# Patient Record
Sex: FEMALE | Race: BLACK OR AFRICAN AMERICAN | NOT HISPANIC OR LATINO | Employment: FULL TIME | URBAN - METROPOLITAN AREA
[De-identification: names, ages, dates, MRNs, and addresses within clinical notes are randomized per-mention and may not be internally consistent; named-entity substitution may affect disease eponyms.]

---

## 2019-11-11 ENCOUNTER — OFFICE VISIT (OUTPATIENT)
Dept: OBSTETRICS AND GYNECOLOGY | Facility: CLINIC | Age: 39
End: 2019-11-11
Payer: MEDICAID

## 2019-11-11 VITALS
HEIGHT: 67 IN | WEIGHT: 183.56 LBS | DIASTOLIC BLOOD PRESSURE: 72 MMHG | BODY MASS INDEX: 28.81 KG/M2 | SYSTOLIC BLOOD PRESSURE: 120 MMHG

## 2019-11-11 DIAGNOSIS — D25.9 UTERINE LEIOMYOMA, UNSPECIFIED LOCATION: ICD-10-CM

## 2019-11-11 DIAGNOSIS — N93.9 ABNORMAL UTERINE BLEEDING (AUB): Primary | ICD-10-CM

## 2019-11-11 DIAGNOSIS — Z21 ASYMPTOMATIC HIV INFECTION: ICD-10-CM

## 2019-11-11 LAB
B-HCG UR QL: NEGATIVE
BILIRUB SERPL-MCNC: ABNORMAL MG/DL
BLOOD URINE, POC: ABNORMAL
COLOR, POC UA: ABNORMAL
CTP QC/QA: YES
GLUCOSE UR QL STRIP: NORMAL
KETONES UR QL STRIP: ABNORMAL
LEUKOCYTE ESTERASE URINE, POC: ABNORMAL
NITRITE, POC UA: ABNORMAL
PH, POC UA: 5
PROTEIN, POC: 30
SPECIFIC GRAVITY, POC UA: 1.02
UROBILINOGEN, POC UA: NORMAL

## 2019-11-11 PROCEDURE — 99999 PR PBB SHADOW E&M-NEW PATIENT-LVL III: ICD-10-PCS | Mod: PBBFAC,,, | Performed by: OBSTETRICS & GYNECOLOGY

## 2019-11-11 PROCEDURE — 81025 URINE PREGNANCY TEST: CPT | Mod: PBBFAC,PN | Performed by: OBSTETRICS & GYNECOLOGY

## 2019-11-11 PROCEDURE — 99203 PR OFFICE/OUTPT VISIT, NEW, LEVL III, 30-44 MIN: ICD-10-PCS | Mod: S$PBB,,, | Performed by: OBSTETRICS & GYNECOLOGY

## 2019-11-11 PROCEDURE — 99203 OFFICE O/P NEW LOW 30 MIN: CPT | Mod: S$PBB,,, | Performed by: OBSTETRICS & GYNECOLOGY

## 2019-11-11 PROCEDURE — 99999 PR PBB SHADOW E&M-NEW PATIENT-LVL III: CPT | Mod: PBBFAC,,, | Performed by: OBSTETRICS & GYNECOLOGY

## 2019-11-11 PROCEDURE — 99203 OFFICE O/P NEW LOW 30 MIN: CPT | Mod: PBBFAC,PN | Performed by: OBSTETRICS & GYNECOLOGY

## 2019-11-11 PROCEDURE — 81002 URINALYSIS NONAUTO W/O SCOPE: CPT | Mod: PBBFAC,PN | Performed by: OBSTETRICS & GYNECOLOGY

## 2019-11-11 RX ORDER — ERGOCALCIFEROL 1.25 MG/1
50000 CAPSULE ORAL
COMMUNITY
Start: 2019-10-22 | End: 2020-03-12 | Stop reason: CLARIF

## 2019-11-11 RX ORDER — EFAVIRENZ, EMTRICITABINE, AND TENOFOVIR DISOPROXIL FUMARATE 600; 200; 300 MG/1; MG/1; MG/1
1 TABLET, FILM COATED ORAL DAILY
COMMUNITY
Start: 2019-10-22 | End: 2020-03-12 | Stop reason: CLARIF

## 2019-11-11 NOTE — LETTER
November 11, 2019      Jenny Lombardo MD  [address] [facility]  [address]  [facility] [address]           [facility]  [address], [facility] [address] [place] [address]  Phone: [phone]  Fax: [phone]          Patient: Claudia Wright   MR Number: [id number]   YOB: 1980   Date of Visit: 11/11/2019       Dear Dr. Jenny Lombardo:    Thank you for referring Claudia Wright to me for evaluation. Attached you will find relevant portions of my assessment and plan of care.    If you have questions, please do not hesitate to call me. I look forward to following Claudia Wright along with you.    Sincerely,    FCO Shrestha MD    Enclosure  CC:  No Recipients    If you would like to receive this communication electronically, please contact [email or web address] or [phone] to request more information on [employer] access.    For providers and/or their staff who would like to refer a patient to [facility], please contact us through our one-stop-shop provider referral line, [facility], [place], at [phone].    If you feel you have received this communication in error or would no longer like to receive these types of communications, please e-mail [email or web address]

## 2019-11-11 NOTE — PROGRESS NOTES
"History & Physical  Gynecology      SUBJECTIVE:     Chief Complaint: Fibroids       History of Present Illness:  38 y/o female presents today for discussion of management of leiomyoma. Per patient her menstrual cycles have become "very heavy" over the past year, and she uses 7-8 pads per day, cycles last 4-5 days per month. She has normal monthly cycles. She had an U/S performed at an outside facility she states she had 10 fibroids and they were "large"    Of note she has HIV, she is compliant with her medication, CD4 count is normal, viral load is undetectable.    Abdominal surgeries: lap BTL  OB hx:  x4,       Review of patient's allergies indicates:  No Known Allergies    History reviewed. No pertinent past medical history.  Past Surgical History:   Procedure Laterality Date    TUBAL LIGATION       OB History        4    Para        Term                AB        Living   4       SAB        TAB        Ectopic        Multiple        Live Births                   Family History   Problem Relation Age of Onset    Breast cancer Mother     Colon cancer Neg Hx     Ovarian cancer Neg Hx      Social History     Tobacco Use    Smoking status: Unknown If Ever Smoked   Substance Use Topics    Alcohol use: Yes     Frequency: Never     Comment: ocassional    Drug use: No       Current Outpatient Medications   Medication Sig    ATRIPLA 600-200-300 mg Tab     VITAMIN D2 50,000 unit capsule     meloxicam (MOBIC) 15 MG tablet Take 1 tablet (15 mg total) by mouth once daily. (Patient not taking: Reported on 2019)     No current facility-administered medications for this visit.          Review of Systems:  Review of Systems   Constitutional: Negative for appetite change, chills, diaphoresis, fatigue and fever.   Respiratory: Negative for cough and shortness of breath.    Cardiovascular: Negative for chest pain and palpitations.   Gastrointestinal: Negative.  Negative for abdominal pain, constipation, " diarrhea, nausea and vomiting.   Genitourinary: Positive for menorrhagia and menstrual problem. Negative for decreased libido, dysuria, frequency, pelvic pain, urgency, vaginal bleeding, vaginal discharge, vaginal pain and vaginal odor.        OBJECTIVE:     Physical Exam:  Physical Exam   Constitutional: She appears well-developed and well-nourished.   Neck: No tracheal deviation present. No thyromegaly present.   Cardiovascular: Normal rate and regular rhythm. Exam reveals no gallop and no friction rub.   No murmur heard.  Pulmonary/Chest: Effort normal and breath sounds normal. No stridor. No respiratory distress. She has no wheezes. She has no rales. She exhibits no tenderness.   Abdominal: Soft. Bowel sounds are normal.   Genitourinary: No vaginal discharge found.   Genitourinary Comments: Normal appearing urethra, urethral meatus, external genital. Cervix without lesions or friability. Uterus is globally enlarged at 16 weeks, mobile.     Lymphadenopathy:     She has no cervical adenopathy.         ASSESSMENT:       ICD-10-CM ICD-9-CM    1. Uterine leiomyoma, unspecified location D25.9 218.9 US Pelvis Comp with Transvag NON-OB (xpd   2. Asymptomatic HIV infection Z21 V08           Plan:      Claudia was seen today for fibroids.    Diagnoses and all orders for this visit:    Uterine leiomyoma, unspecified location  -     US Pelvis Comp with Transvag NON-OB (xpd; Future  -     Discussed medical versus surgical management, patient is aware of possible non-surgical options including depo provera, and OCP's, she is adamantly interested in definitive surgical management.    Asymptomatic HIV infection    Other orders  -     POCT URINE DIPSTICK WITHOUT MICROSCOPE        Orders Placed This Encounter   Procedures    US Pelvis Comp with Transvag NON-OB (xpd       Follow up in about 2 weeks (around 11/25/2019) for Discuss management of AUB.     Counseling time: 45 minutes    SHIREEN Shrestha

## 2019-11-15 ENCOUNTER — TELEPHONE (OUTPATIENT)
Dept: OBSTETRICS AND GYNECOLOGY | Facility: CLINIC | Age: 39
End: 2019-11-15

## 2019-11-15 NOTE — TELEPHONE ENCOUNTER
Spoke with pt about US results.. Verbalized understanding. Consult scheduled for 11-20-19 with Dr. Shrestha

## 2019-11-15 NOTE — TELEPHONE ENCOUNTER
----- Message from FCO Shrestha MD sent at 11/15/2019  2:05 PM CST -----  Globally enlarged uterus, patient desires surgery, please schedule a visit so she can discuss management options with me.

## 2019-11-20 ENCOUNTER — OFFICE VISIT (OUTPATIENT)
Dept: OBSTETRICS AND GYNECOLOGY | Facility: CLINIC | Age: 39
End: 2019-11-20
Payer: MEDICAID

## 2019-11-20 VITALS
HEIGHT: 67 IN | SYSTOLIC BLOOD PRESSURE: 108 MMHG | DIASTOLIC BLOOD PRESSURE: 72 MMHG | BODY MASS INDEX: 28.44 KG/M2 | WEIGHT: 181.19 LBS

## 2019-11-20 DIAGNOSIS — Z01.818 PRE-OP EVALUATION: Primary | ICD-10-CM

## 2019-11-20 PROCEDURE — 99999 PR PBB SHADOW E&M-EST. PATIENT-LVL III: CPT | Mod: PBBFAC,,, | Performed by: OBSTETRICS & GYNECOLOGY

## 2019-11-20 PROCEDURE — 99499 NO LOS: ICD-10-PCS | Mod: S$PBB,,, | Performed by: OBSTETRICS & GYNECOLOGY

## 2019-11-20 PROCEDURE — 87801 DETECT AGNT MULT DNA AMPLI: CPT

## 2019-11-20 PROCEDURE — 99999 PR PBB SHADOW E&M-EST. PATIENT-LVL III: ICD-10-PCS | Mod: PBBFAC,,, | Performed by: OBSTETRICS & GYNECOLOGY

## 2019-11-20 PROCEDURE — 99499 UNLISTED E&M SERVICE: CPT | Mod: S$PBB,,, | Performed by: OBSTETRICS & GYNECOLOGY

## 2019-11-20 PROCEDURE — 99213 OFFICE O/P EST LOW 20 MIN: CPT | Mod: PBBFAC,PN | Performed by: OBSTETRICS & GYNECOLOGY

## 2019-11-20 PROCEDURE — 87481 CANDIDA DNA AMP PROBE: CPT | Mod: 59

## 2019-11-20 PROCEDURE — 87491 CHLMYD TRACH DNA AMP PROBE: CPT | Mod: 59

## 2019-11-20 RX ORDER — MUPIROCIN 20 MG/G
OINTMENT TOPICAL
Status: CANCELLED | OUTPATIENT
Start: 2019-11-20

## 2019-11-20 NOTE — PROGRESS NOTES
History & Physical  Gynecology      SUBJECTIVE:     Chief Complaint: Consult (u/s results)       History of Present Illness:  38 y/o presents today for discussion of management of AUB-L. U/S revealed globally enlarged uterus (16x11). She is not interested in medical management after being counseled of all her options and desires definitive surgical management because her menorrhagia is affecting her quality of life.       Review of patient's allergies indicates:  No Known Allergies    History reviewed. No pertinent past medical history.  Past Surgical History:   Procedure Laterality Date    TUBAL LIGATION       OB History        4    Para        Term                AB        Living   4       SAB        TAB        Ectopic        Multiple        Live Births                   Family History   Problem Relation Age of Onset    Breast cancer Mother     Colon cancer Neg Hx     Ovarian cancer Neg Hx      Social History     Tobacco Use    Smoking status: Unknown If Ever Smoked   Substance Use Topics    Alcohol use: Yes     Frequency: Never     Comment: ocassional    Drug use: No       Current Outpatient Medications   Medication Sig    ATRIPLA 600-200-300 mg Tab     VITAMIN D2 50,000 unit capsule     meloxicam (MOBIC) 15 MG tablet Take 1 tablet (15 mg total) by mouth once daily. (Patient not taking: Reported on 2019)     No current facility-administered medications for this visit.          Review of Systems:  Review of Systems   Constitutional: Negative for appetite change, chills, diaphoresis, fatigue and fever.   Respiratory: Negative for cough and shortness of breath.    Cardiovascular: Negative for chest pain and palpitations.   Gastrointestinal: Negative.  Negative for abdominal pain, constipation, diarrhea, nausea and vomiting.   Genitourinary: Positive for menorrhagia. Negative for decreased libido, dysuria, frequency, menstrual problem, pelvic pain, urgency, vaginal bleeding, vaginal  discharge, vaginal pain and vaginal odor.        OBJECTIVE:     Physical Exam:  Physical Exam   Constitutional: She appears well-developed and well-nourished.   Neck: No tracheal deviation present. No thyromegaly present.   Cardiovascular: Normal rate and regular rhythm. Exam reveals no gallop and no friction rub.   No murmur heard.  Pulmonary/Chest: Effort normal and breath sounds normal. No stridor. No respiratory distress. She has no wheezes. She has no rales. She exhibits no tenderness.   Abdominal: Soft. Bowel sounds are normal.   Lymphadenopathy:     She has no cervical adenopathy.         ASSESSMENT:       ICD-10-CM ICD-9-CM    1. Pre-op evaluation Z01.818 V72.84 CBC auto differential      C. trachomatis/N. gonorrhoeae by AMP DNA      Vaginosis Screen by DNA Probe          Plan:      Patient is to have total abdominal hysterectomy for abnormal uterine bleeding 12/10/2019    - Labs - CBC today  - Vaginosis, GC/CT today  - Case request placed & pre-op orders completed  - Consents signed today.  - Anticoagulation : Patient is not on antiocoagulation.      Counseling time: 45 minutes    SHIREEN Shrestha

## 2019-11-21 LAB
BACTERIAL VAGINOSIS DNA: POSITIVE
CANDIDA GLABRATA DNA: NEGATIVE
CANDIDA KRUSEI DNA: NEGATIVE
CANDIDA RRNA VAG QL PROBE: NEGATIVE
T VAGINALIS RRNA GENITAL QL PROBE: POSITIVE

## 2019-11-22 ENCOUNTER — TELEPHONE (OUTPATIENT)
Dept: OBSTETRICS AND GYNECOLOGY | Facility: CLINIC | Age: 39
End: 2019-11-22

## 2019-11-22 DIAGNOSIS — N76.0 BACTERIAL VAGINOSIS: Primary | ICD-10-CM

## 2019-11-22 DIAGNOSIS — B96.89 BACTERIAL VAGINOSIS: Primary | ICD-10-CM

## 2019-11-22 DIAGNOSIS — A59.01 TRICHOMONAS VAGINITIS: ICD-10-CM

## 2019-11-22 LAB
C TRACH DNA SPEC QL NAA+PROBE: NOT DETECTED
N GONORRHOEA DNA SPEC QL NAA+PROBE: NOT DETECTED

## 2019-11-22 RX ORDER — METRONIDAZOLE 500 MG/1
500 TABLET ORAL 2 TIMES DAILY
Qty: 14 TABLET | Refills: 0 | Status: SHIPPED | OUTPATIENT
Start: 2019-11-22 | End: 2019-11-29

## 2019-11-22 NOTE — TELEPHONE ENCOUNTER
Notified patient of results. Verbalized understanding.   Advised pt not to drink while on this medication and for 24 hours after the last dose, to notify any sexual partners shes had in the last 60 days and that her current partner should be treated and can be treated at most clinics.

## 2019-11-22 NOTE — TELEPHONE ENCOUNTER
----- Message from FCO Shrestha MD sent at 11/22/2019 10:47 AM CST -----  Screen positive for trich and BV, please notify patient.

## 2019-12-10 PROBLEM — D62 ACUTE ON CHRONIC BLOOD LOSS ANEMIA: Status: ACTIVE | Noted: 2019-12-10

## 2019-12-10 PROBLEM — Z90.710 S/P TAH (TOTAL ABDOMINAL HYSTERECTOMY): Status: ACTIVE | Noted: 2019-12-10

## 2019-12-30 ENCOUNTER — TELEPHONE (OUTPATIENT)
Dept: OBSTETRICS AND GYNECOLOGY | Facility: CLINIC | Age: 39
End: 2019-12-30

## 2019-12-30 NOTE — TELEPHONE ENCOUNTER
Spoke with pt. Pt states Dr Shrestha had called her to talk about her symptoms. No further questions or concerns noted.

## 2019-12-30 NOTE — TELEPHONE ENCOUNTER
----- Message from Trena Hayes sent at 12/30/2019 11:00 AM CST -----  Contact: BRISA CHANEL [[id number]]  Name of Who is Calling: BRISA CHANEL [[id number]]    What is the request in detail: patient had surgery on 12/10 states her legs and stomach is still numb. Patient would like to know if it is normal. Please call      Can the clinic reply by [facility]: no    [facility]: [phone]

## 2019-12-30 NOTE — TELEPHONE ENCOUNTER
Spoke with patient, she is having mild numbness on left side of leg, reports mainly on anterior and somewhat medial surface as well as numbness on her abdomen. I spoke to her than this happens in about 1:10 open hysterectomies, this is some mild irritation to the femoral nerve, She reports no muscle weakness or lack of function and voices understanding that this will almost certainly resolve on its own.

## 2020-01-20 ENCOUNTER — OFFICE VISIT (OUTPATIENT)
Dept: OBSTETRICS AND GYNECOLOGY | Facility: CLINIC | Age: 40
End: 2020-01-20
Payer: MEDICAID

## 2020-01-20 VITALS
HEIGHT: 67 IN | WEIGHT: 178.25 LBS | DIASTOLIC BLOOD PRESSURE: 76 MMHG | SYSTOLIC BLOOD PRESSURE: 112 MMHG | BODY MASS INDEX: 27.98 KG/M2

## 2020-01-20 DIAGNOSIS — N89.8 VAGINAL DISCHARGE: ICD-10-CM

## 2020-01-20 DIAGNOSIS — Z98.890 POST-OPERATIVE STATE: Primary | ICD-10-CM

## 2020-01-20 PROCEDURE — 99999 PR PBB SHADOW E&M-EST. PATIENT-LVL III: CPT | Mod: PBBFAC,,, | Performed by: OBSTETRICS & GYNECOLOGY

## 2020-01-20 PROCEDURE — 99213 OFFICE O/P EST LOW 20 MIN: CPT | Mod: PBBFAC,PN | Performed by: OBSTETRICS & GYNECOLOGY

## 2020-01-20 PROCEDURE — 87481 CANDIDA DNA AMP PROBE: CPT | Mod: 59

## 2020-01-20 PROCEDURE — 99999 PR PBB SHADOW E&M-EST. PATIENT-LVL III: ICD-10-PCS | Mod: PBBFAC,,, | Performed by: OBSTETRICS & GYNECOLOGY

## 2020-01-20 PROCEDURE — 99499 UNLISTED E&M SERVICE: CPT | Mod: S$PBB,,, | Performed by: OBSTETRICS & GYNECOLOGY

## 2020-01-20 PROCEDURE — 99499 NO LOS: ICD-10-PCS | Mod: S$PBB,,, | Performed by: OBSTETRICS & GYNECOLOGY

## 2020-01-20 PROCEDURE — 87491 CHLMYD TRACH DNA AMP PROBE: CPT

## 2020-01-20 NOTE — PROGRESS NOTES
History & Physical  Gynecology      SUBJECTIVE:     Chief Complaint: Post-op Evaluation       History of Present Illness:  40 y/o s/p ESTELLA/BS 12/10 with cystoscopy. Path benign. Denies any issues.    Uterus, cervix, bilateral tubes, hysterectomy and bilateral salpingectomy:  - Proliferative endometrium.  - Leiomyoma and cellular leiomyoma.  - Cervix without significant pathological change.  - Fallopian tubes without significant pathological change.      Review of patient's allergies indicates:  No Known Allergies    Past Medical History:   Diagnosis Date    Abnormal uterine bleeding (AUB)     Bacterial vaginosis     DUB (dysfunctional uterine bleeding)     HIV (human immunodeficiency virus infection)     Leiomyoma of uterus     Trichomonas vaginitis     Vitamin D deficiency      Past Surgical History:   Procedure Laterality Date    CYSTOSCOPY Bilateral 12/10/2019    Procedure: CYSTOSCOPY;  Surgeon: FCO Shrestha MD;  Location: [facility] OR;  Service: OB/GYN;  Laterality: Bilateral;    TOTAL ABDOMINAL HYSTERECTOMY N/A 12/10/2019    Procedure: HYSTERECTOMY, TOTAL, ABDOMINAL;  Surgeon: FCO Shrestha MD;  Location: [facility] OR;  Service: OB/GYN;  Laterality: N/A;    TUBAL LIGATION       OB History        4    Para        Term                AB        Living   4       SAB        TAB        Ectopic        Multiple        Live Births                   Family History   Problem Relation Age of Onset    Breast cancer Mother     Colon cancer Neg Hx     Ovarian cancer Neg Hx      Social History     Tobacco Use    Smoking status: Unknown If Ever Smoked   Substance Use Topics    Alcohol use: Yes     Frequency: Never     Comment: ocassional    Drug use: No       Current Outpatient Medications   Medication Sig    ATRIPLA 600-200-300 mg Tab Take 1 tablet by mouth once daily.     ferrous sulfate (FEOSOL) 325 mg (65 mg iron) Tab tablet Take 1 tablet (325 mg total) by mouth once daily.    ibuprofen  (ADVIL,MOTRIN) 600 MG tablet Take 1 tablet (600 mg total) by mouth 3 (three) times daily.    VITAMIN D2 50,000 unit capsule Take 50,000 Units by mouth every 7 days.     oxyCODONE-acetaminophen (PERCOCET) 5-325 mg per tablet Take 1 tablet by mouth every 4 (four) hours as needed for Pain. (Patient not taking: Reported on 1/20/2020)     No current facility-administered medications for this visit.          Review of Systems:  Review of Systems   Constitutional: Negative for appetite change, chills, diaphoresis, fatigue and fever.   Respiratory: Negative for cough and shortness of breath.    Cardiovascular: Negative for chest pain and palpitations.   Gastrointestinal: Negative.  Negative for abdominal pain, constipation, diarrhea, nausea and vomiting.   Genitourinary: Positive for vaginal discharge. Negative for decreased libido, dysuria, frequency, menstrual problem, pelvic pain, urgency, vaginal bleeding, vaginal pain and vaginal odor.        OBJECTIVE:     Physical Exam:  Physical Exam   Constitutional: She appears well-developed and well-nourished.   Neck: No tracheal deviation present. No thyromegaly present.   Cardiovascular: Normal rate and regular rhythm. Exam reveals no gallop and no friction rub.   No murmur heard.  Pulmonary/Chest: Effort normal and breath sounds normal. No stridor. No respiratory distress. She has no wheezes. She has no rales. She exhibits no tenderness.   Abdominal: Soft. Bowel sounds are normal.   Genitourinary: No vaginal discharge found.       Genitourinary Comments: Opaque yellow discharge present, small superficial defect in vaginal cuff in left lateral   Lymphadenopathy:     She has no cervical adenopathy.         ASSESSMENT:       ICD-10-CM ICD-9-CM    1. Post-operative state Z98.890 V45.89           Plan:      Claudia was seen today for post-op evaluation.    Diagnoses and all orders for this visit:    Post-operative state    - small superficial defect in left cuff, pelvic rest for  now, recheck cuff in 2-3 weeks    No orders of the defined types were placed in this encounter.      Follow up in about 2 weeks (around 2/3/2020) for F/U cuff check.         SHIREEN Shrestha

## 2020-01-21 ENCOUNTER — TELEPHONE (OUTPATIENT)
Dept: OBSTETRICS AND GYNECOLOGY | Facility: CLINIC | Age: 40
End: 2020-01-21

## 2020-01-21 LAB
BACTERIAL VAGINOSIS DNA: POSITIVE
CANDIDA GLABRATA DNA: NEGATIVE
CANDIDA KRUSEI DNA: NEGATIVE
CANDIDA RRNA VAG QL PROBE: NEGATIVE
T VAGINALIS RRNA GENITAL QL PROBE: NEGATIVE

## 2020-01-21 NOTE — TELEPHONE ENCOUNTER
----- Message from Dashawn Holt sent at 1/21/2020 10:05 AM CST -----  Contact: Pt   Name of Who is Calling: BRISA CHANEL [[id number]]    What is the request in detail: Pt is requesting a call back regards to a return back to work note ,....... Please contact to further discuss and advise      Can the clinic reply by [facility]: No     What Number to [facility] Back [facility]:  [phone] (home)

## 2020-01-21 NOTE — TELEPHONE ENCOUNTER
Spoke with pt. Pt states she needs a work note for when she had surgery. Advised pt I would have letter ready for  at the . Pt verbalized understanding.

## 2020-01-22 ENCOUNTER — TELEPHONE (OUTPATIENT)
Dept: OBSTETRICS AND GYNECOLOGY | Facility: CLINIC | Age: 40
End: 2020-01-22

## 2020-01-22 DIAGNOSIS — B96.89 BACTERIAL VAGINOSIS: Primary | ICD-10-CM

## 2020-01-22 DIAGNOSIS — N76.0 BACTERIAL VAGINOSIS: Primary | ICD-10-CM

## 2020-01-22 LAB
C TRACH DNA SPEC QL NAA+PROBE: NOT DETECTED
N GONORRHOEA DNA SPEC QL NAA+PROBE: NOT DETECTED

## 2020-01-22 RX ORDER — METRONIDAZOLE 500 MG/1
500 TABLET ORAL 2 TIMES DAILY
Qty: 14 TABLET | Refills: 0 | Status: SHIPPED | OUTPATIENT
Start: 2020-01-22 | End: 2020-01-29

## 2020-01-22 NOTE — TELEPHONE ENCOUNTER
----- Message from FCO Shrestha MD sent at 1/22/2020 10:08 AM CST -----  Vaginosis screen positive for BV, prescription sent. Please notify patient.

## 2020-02-05 ENCOUNTER — OFFICE VISIT (OUTPATIENT)
Dept: OBSTETRICS AND GYNECOLOGY | Facility: CLINIC | Age: 40
End: 2020-02-05
Payer: MEDICAID

## 2020-02-05 VITALS
WEIGHT: 177.69 LBS | BODY MASS INDEX: 27.89 KG/M2 | DIASTOLIC BLOOD PRESSURE: 64 MMHG | HEIGHT: 67 IN | SYSTOLIC BLOOD PRESSURE: 108 MMHG

## 2020-02-05 DIAGNOSIS — Z98.890 POST-OPERATIVE STATE: ICD-10-CM

## 2020-02-05 PROCEDURE — 99213 OFFICE O/P EST LOW 20 MIN: CPT | Mod: PBBFAC,PN | Performed by: OBSTETRICS & GYNECOLOGY

## 2020-02-05 PROCEDURE — 99499 NO LOS: ICD-10-PCS | Mod: S$PBB,,, | Performed by: OBSTETRICS & GYNECOLOGY

## 2020-02-05 PROCEDURE — 99999 PR PBB SHADOW E&M-EST. PATIENT-LVL III: CPT | Mod: PBBFAC,,, | Performed by: OBSTETRICS & GYNECOLOGY

## 2020-02-05 PROCEDURE — 99499 UNLISTED E&M SERVICE: CPT | Mod: S$PBB,,, | Performed by: OBSTETRICS & GYNECOLOGY

## 2020-02-05 PROCEDURE — 99999 PR PBB SHADOW E&M-EST. PATIENT-LVL III: ICD-10-PCS | Mod: PBBFAC,,, | Performed by: OBSTETRICS & GYNECOLOGY

## 2020-02-05 NOTE — PROGRESS NOTES
History & Physical  Gynecology      SUBJECTIVE:     Chief Complaint: Post-op Evaluation       History of Present Illness:  40 y/o presents for f/u of cuff healing/ s/p ESTELLA 12/10/2019. She was seen 2 weeks ago and superficial defect noted in cuff on left side. Doing well today w/o complaints, denies pain or bleeding.      Review of patient's allergies indicates:  No Known Allergies    Past Medical History:   Diagnosis Date    Abnormal uterine bleeding (AUB)     Bacterial vaginosis     DUB (dysfunctional uterine bleeding)     HIV (human immunodeficiency virus infection)     Leiomyoma of uterus     Trichomonas vaginitis     Vitamin D deficiency      Past Surgical History:   Procedure Laterality Date    CYSTOSCOPY Bilateral 12/10/2019    Procedure: CYSTOSCOPY;  Surgeon: FCO Shrestha MD;  Location: [facility] OR;  Service: OB/GYN;  Laterality: Bilateral;    TOTAL ABDOMINAL HYSTERECTOMY N/A 12/10/2019    Procedure: HYSTERECTOMY, TOTAL, ABDOMINAL;  Surgeon: FCO Shrestha MD;  Location: [facility] OR;  Service: OB/GYN;  Laterality: N/A;    TUBAL LIGATION       OB History        4    Para        Term                AB        Living   4       SAB        TAB        Ectopic        Multiple        Live Births                   Family History   Problem Relation Age of Onset    Breast cancer Mother     Colon cancer Neg Hx     Ovarian cancer Neg Hx      Social History     Tobacco Use    Smoking status: Unknown If Ever Smoked   Substance Use Topics    Alcohol use: Yes     Frequency: Never     Comment: ocassional    Drug use: No       Current Outpatient Medications   Medication Sig    ATRIPLA 600-200-300 mg Tab Take 1 tablet by mouth once daily.     ferrous sulfate (FEOSOL) 325 mg (65 mg iron) Tab tablet Take 1 tablet (325 mg total) by mouth once daily.    VITAMIN D2 50,000 unit capsule Take 50,000 Units by mouth every 7 days.     ibuprofen (ADVIL,MOTRIN) 600 MG tablet Take 1 tablet (600 mg total) by mouth  3 (three) times daily. (Patient not taking: Reported on 2/5/2020)    oxyCODONE-acetaminophen (PERCOCET) 5-325 mg per tablet Take 1 tablet by mouth every 4 (four) hours as needed for Pain. (Patient not taking: Reported on 1/20/2020)     No current facility-administered medications for this visit.          Review of Systems:  Review of Systems   Constitutional: Negative for appetite change, chills, diaphoresis, fatigue and fever.   Respiratory: Negative for cough and shortness of breath.    Cardiovascular: Negative for chest pain and palpitations.   Gastrointestinal: Negative.  Negative for abdominal pain, constipation, diarrhea, nausea and vomiting.   Genitourinary: Negative for decreased libido, dysuria, frequency, menstrual problem, pelvic pain, urgency, vaginal bleeding, vaginal discharge, vaginal pain and vaginal odor.        OBJECTIVE:     Physical Exam:  Physical Exam   Genitourinary:   Genitourinary Comments: Cuff has healed         ASSESSMENT:       ICD-10-CM ICD-9-CM    1. Post-operative state Z98.890 V45.89           Plan:      1. Post-operative state  - Cuff is now healed, patient can d\c pelvic rest, cautioned patient if she experiences pain or bleeding to stop intercourse and f/u with office for evaluation.       Counseling time: 15 minutes    SHIREEN Shrestha

## 2021-03-06 ENCOUNTER — IMMUNIZATION (OUTPATIENT)
Dept: PRIMARY CARE CLINIC | Facility: CLINIC | Age: 41
End: 2021-03-06
Payer: MEDICAID

## 2021-03-06 DIAGNOSIS — Z23 NEED FOR VACCINATION: Primary | ICD-10-CM

## 2021-03-06 PROCEDURE — 91300 PR SARS-COV- 2 COVID-19 VACCINE, NO PRSV, 30MCG/0.3ML, IM: ICD-10-PCS | Mod: S$GLB,,, | Performed by: INTERNAL MEDICINE

## 2021-03-06 PROCEDURE — 91300 PR SARS-COV- 2 COVID-19 VACCINE, NO PRSV, 30MCG/0.3ML, IM: CPT | Mod: S$GLB,,, | Performed by: INTERNAL MEDICINE

## 2021-03-06 PROCEDURE — 0001A PR IMMUNIZ ADMIN, SARS-COV-2 COVID-19 VACC, 30MCG/0.3ML, 1ST DOSE: ICD-10-PCS | Mod: CV19,S$GLB,, | Performed by: INTERNAL MEDICINE

## 2021-03-06 PROCEDURE — 0001A PR IMMUNIZ ADMIN, SARS-COV-2 COVID-19 VACC, 30MCG/0.3ML, 1ST DOSE: CPT | Mod: CV19,S$GLB,, | Performed by: INTERNAL MEDICINE

## 2021-03-06 RX ADMIN — Medication 0.3 ML: at 10:03

## 2021-03-27 ENCOUNTER — IMMUNIZATION (OUTPATIENT)
Dept: PRIMARY CARE CLINIC | Facility: CLINIC | Age: 41
End: 2021-03-27
Payer: MEDICAID

## 2021-03-27 DIAGNOSIS — Z23 NEED FOR VACCINATION: Primary | ICD-10-CM

## 2021-03-27 PROCEDURE — 0002A PR IMMUNIZ ADMIN, SARS-COV-2 COVID-19 VACC, 30MCG/0.3ML, 2ND DOSE: CPT | Mod: CV19,S$GLB,, | Performed by: INTERNAL MEDICINE

## 2021-03-27 PROCEDURE — 91300 PR SARS-COV- 2 COVID-19 VACCINE, NO PRSV, 30MCG/0.3ML, IM: CPT | Mod: S$GLB,,, | Performed by: INTERNAL MEDICINE

## 2021-03-27 PROCEDURE — 91300 PR SARS-COV- 2 COVID-19 VACCINE, NO PRSV, 30MCG/0.3ML, IM: ICD-10-PCS | Mod: S$GLB,,, | Performed by: INTERNAL MEDICINE

## 2021-03-27 PROCEDURE — 0002A PR IMMUNIZ ADMIN, SARS-COV-2 COVID-19 VACC, 30MCG/0.3ML, 2ND DOSE: ICD-10-PCS | Mod: CV19,S$GLB,, | Performed by: INTERNAL MEDICINE

## 2021-03-27 RX ADMIN — Medication 0.3 ML: at 10:03

## 2023-03-28 ENCOUNTER — PATIENT MESSAGE (OUTPATIENT)
Dept: RESEARCH | Facility: HOSPITAL | Age: 43
End: 2023-03-28
Payer: MEDICAID